# Patient Record
Sex: FEMALE | Race: WHITE | NOT HISPANIC OR LATINO | Employment: PART TIME | URBAN - METROPOLITAN AREA
[De-identification: names, ages, dates, MRNs, and addresses within clinical notes are randomized per-mention and may not be internally consistent; named-entity substitution may affect disease eponyms.]

---

## 2019-03-14 ENCOUNTER — OFFICE VISIT (OUTPATIENT)
Dept: INTERNAL MEDICINE | Facility: CLINIC | Age: 49
End: 2019-03-14
Attending: FAMILY MEDICINE
Payer: COMMERCIAL

## 2019-03-14 VITALS
HEART RATE: 78 BPM | HEIGHT: 67 IN | BODY MASS INDEX: 22.25 KG/M2 | SYSTOLIC BLOOD PRESSURE: 120 MMHG | DIASTOLIC BLOOD PRESSURE: 82 MMHG | WEIGHT: 141.75 LBS | OXYGEN SATURATION: 97 %

## 2019-03-14 DIAGNOSIS — K21.9 LARYNGOPHARYNGEAL REFLUX (LPR): Primary | ICD-10-CM

## 2019-03-14 DIAGNOSIS — R05.9 COUGH: ICD-10-CM

## 2019-03-14 PROCEDURE — 99999 PR PBB SHADOW E&M-NEW PATIENT-LVL IV: CPT | Mod: PBBFAC,,, | Performed by: FAMILY MEDICINE

## 2019-03-14 PROCEDURE — 99204 PR OFFICE/OUTPT VISIT, NEW, LEVL IV, 45-59 MIN: ICD-10-PCS | Mod: S$GLB,,, | Performed by: FAMILY MEDICINE

## 2019-03-14 PROCEDURE — 99204 OFFICE O/P NEW MOD 45 MIN: CPT | Mod: S$GLB,,, | Performed by: FAMILY MEDICINE

## 2019-03-14 PROCEDURE — 3008F PR BODY MASS INDEX (BMI) DOCUMENTED: ICD-10-PCS | Mod: CPTII,S$GLB,, | Performed by: FAMILY MEDICINE

## 2019-03-14 PROCEDURE — 99999 PR PBB SHADOW E&M-NEW PATIENT-LVL IV: ICD-10-PCS | Mod: PBBFAC,,, | Performed by: FAMILY MEDICINE

## 2019-03-14 PROCEDURE — 3008F BODY MASS INDEX DOCD: CPT | Mod: CPTII,S$GLB,, | Performed by: FAMILY MEDICINE

## 2019-03-14 RX ORDER — ALBUTEROL SULFATE 90 UG/1
2 AEROSOL, METERED RESPIRATORY (INHALATION) EVERY 6 HOURS PRN
Qty: 1 EACH | Refills: 11 | Status: SHIPPED | OUTPATIENT
Start: 2019-03-14

## 2019-03-14 RX ORDER — LEVOTHYROXINE SODIUM 125 UG/1
125 TABLET ORAL
COMMUNITY
Start: 2018-10-01

## 2019-03-14 RX ORDER — FLUOXETINE HYDROCHLORIDE 20 MG/1
20 CAPSULE ORAL
COMMUNITY
Start: 2018-10-01

## 2019-03-14 RX ORDER — OMEPRAZOLE 40 MG/1
40 CAPSULE, DELAYED RELEASE ORAL DAILY
Qty: 30 CAPSULE | Refills: 11 | Status: SHIPPED | OUTPATIENT
Start: 2019-03-14 | End: 2019-04-13

## 2019-03-14 RX ORDER — SUMATRIPTAN 50 MG/1
50 TABLET, FILM COATED ORAL
COMMUNITY
Start: 2018-04-25

## 2019-03-14 RX ORDER — ALPRAZOLAM 0.25 MG/1
0.25 TABLET ORAL
COMMUNITY
Start: 2018-10-01 | End: 2019-10-02

## 2019-03-14 NOTE — PROGRESS NOTES
"CHIEF COMPLAINT: Establish a primary care physician    HISTORY OF PRESENT ILLNESS: The patient is a generally healthy 48 year-old white female.  The patient has several month history of worsening substernal chest pain and cough and shortness of breath.  It has been a while since the patient has seen a primary care physician.  The patient wishes to establish a primary care physician.      She does take Synthroid.    REVIEW OF SYSTEMS:  GENERAL: No fever, chills, fatigability or weight loss.  SKIN: No rashes, itching or changes in color or texture of skin.  HEAD: No headaches or recent head trauma.  EYES: Visual acuity fine. No photophobia, ocular pain or diplopia.  EARS: Denies ear pain, discharge or vertigo.  NOSE: No loss of smell, no epistaxis or postnasal drip.  MOUTH & THROAT: No hoarseness or change in voice. No excessive gum bleeding.  NODES: Denies swollen glands.  CHEST: Denies cyanosis, wheezing and sputum production.  CARDIOVASCULAR: Denies PND, orthopnea or reduced exercise tolerance.  ABDOMEN: Appetite fine. No weight loss. Denies diarrhea, abdominal pain, hematemesis or blood in stool.  URINARY: No flank pain, dysuria or hematuria.  PERIPHERAL VASCULAR: No claudication or cyanosis.  MUSCULOSKELETAL: No joint stiffness or swelling. Denies back pain.  NEUROLOGIC: No history of seizures, paralysis, alteration of gait or coordination.    SOCIAL HISTORY: The patient does not smoke.  The patient consumes alcohol socially.  The patient is single.    PHYSICAL EXAMINATION:   Blood pressure 120/82, pulse 78, height 5' 7" (1.702 m), weight 64.3 kg (141 lb 12.1 oz), SpO2 97 %.    APPEARANCE: Well nourished, well developed, in no acute distress.    HEAD: Normocephalic, atraumatic.  EYES: PERRL. EOMI.  Conjunctivae without injection and  anicteric  EARS: TM's intact. Light reflex normal. No retraction or perforation.    NOSE: Mucosa pink. Airway clear.  MOUTH & THROAT: No tonsillar enlargement. No pharyngeal erythema " or exudate. No stridor.  NECK: Supple.   NODES: No cervical, axillary or inguinal lymph node enlargement.  CHEST: Lungs clear to auscultation.  No retractions are noted.  No rales or rhonchi are present.  CARDIOVASCULAR: Normal S1, S2. No rubs, murmurs or gallops.  ABDOMEN: Bowel sounds normal. Not distended. Soft. No tenderness or masses.  No ascites is noted.  MUSCULOSKELETAL:  There is no clubbing, cyanosis, or edema of the extremities x4.  There is full range of motion of the lumbar spine.  There is full range of motion of the extremities x4.  There is no deformity noted.    NEUROLOGIC:       Normal speech development.      Hearing normal.      Normal gait.      Motor and sensory exams grossly normal.      DTR's normal.  PSYCHIATRIC: Patient is alert and oriented x3.  Thought processes are all normal.  There is no homicidality.  There is no suicidality.  There is no evidence of psychosis.    LABORATORY/RADIOLOGY:   Chart reviewed.  We will update blood work today.    ASSESSMENT:   Chest pain, shortness of breath and cough most consistent with laryngopharyngeal reflux disease    PLAN:  zegerid [facility]  Omeprazole  GI consult if symptoms persist  Return to clinic in one year.